# Patient Record
Sex: FEMALE | Race: WHITE
[De-identification: names, ages, dates, MRNs, and addresses within clinical notes are randomized per-mention and may not be internally consistent; named-entity substitution may affect disease eponyms.]

---

## 2018-11-29 ENCOUNTER — HOSPITAL ENCOUNTER (OUTPATIENT)
Dept: HOSPITAL 31 - C.ENDO | Age: 53
Discharge: HOME | End: 2018-11-29
Attending: INTERNAL MEDICINE
Payer: COMMERCIAL

## 2018-11-29 VITALS
TEMPERATURE: 97.9 F | HEART RATE: 69 BPM | RESPIRATION RATE: 21 BRPM | DIASTOLIC BLOOD PRESSURE: 62 MMHG | SYSTOLIC BLOOD PRESSURE: 117 MMHG

## 2018-11-29 VITALS — BODY MASS INDEX: 36.8 KG/M2

## 2018-11-29 VITALS — OXYGEN SATURATION: 100 %

## 2018-11-29 DIAGNOSIS — K64.8: ICD-10-CM

## 2018-11-29 DIAGNOSIS — D12.2: ICD-10-CM

## 2018-11-29 DIAGNOSIS — Z86.010: Primary | ICD-10-CM

## 2018-11-29 DIAGNOSIS — Z12.11: ICD-10-CM

## 2018-11-29 PROCEDURE — 88305 TISSUE EXAM BY PATHOLOGIST: CPT

## 2018-11-29 PROCEDURE — 45385 COLONOSCOPY W/LESION REMOVAL: CPT

## 2018-11-29 PROCEDURE — 84703 CHORIONIC GONADOTROPIN ASSAY: CPT

## 2018-11-29 NOTE — CP.SDSHP
Same Day Surgery [facility]





- History


Proposed Procedure: colonoscopy


Pre-Op Diagnosis: history of colon polyps





- Previous Medical/Surgical History


Pulmonary: Asthma





- Allergies


Allergies: 


Allergies





morphine Allergy (Intermediate, Verified 11/28/18 14:05)


   SWELLING











- Physical Exam


General Appearance: NAD


Vital Signs: 


                                   Vital Signs











  11/29/18





  07:59


 


Temperature 98.4 F


 


Pulse Rate 63


 


Respiratory 18





Rate 


 


Blood Pressure 115/65


 


O2 Sat by Pulse 98





Oximetry 











Mental Status: Alert & Oriented x3


Neuro: WNL


Heart: WNL


Lungs: WNL


GI: WNL





- {Optional Preform as Required}


Abdomen: WNL





- Impression


Pt. Evaluated Today:Candidate for Anesthesia & Procedure: Yes





- Date & Time


Date: 11/29/18


Time: 10:05





Short Stay Discharge





- Short Stay Discharge


Admitting Diagnosis/Reason for Visit: COLONIC POLYPS


Disposition: HOME/ ROUTINE